# Patient Record
Sex: MALE | Race: WHITE | NOT HISPANIC OR LATINO
[De-identification: names, ages, dates, MRNs, and addresses within clinical notes are randomized per-mention and may not be internally consistent; named-entity substitution may affect disease eponyms.]

---

## 2022-02-20 ENCOUNTER — TRANSCRIPTION ENCOUNTER (OUTPATIENT)
Age: 72
End: 2022-02-20

## 2022-04-04 ENCOUNTER — TRANSCRIPTION ENCOUNTER (OUTPATIENT)
Age: 72
End: 2022-04-04

## 2022-11-18 PROBLEM — Z00.00 ENCOUNTER FOR PREVENTIVE HEALTH EXAMINATION: Status: ACTIVE | Noted: 2022-11-18

## 2022-11-22 ENCOUNTER — APPOINTMENT (OUTPATIENT)
Dept: CARDIOLOGY | Facility: CLINIC | Age: 72
End: 2022-11-22

## 2022-11-22 ENCOUNTER — NON-APPOINTMENT (OUTPATIENT)
Age: 72
End: 2022-11-22

## 2022-11-22 VITALS
BODY MASS INDEX: 35.07 KG/M2 | HEART RATE: 73 BPM | RESPIRATION RATE: 16 BRPM | OXYGEN SATURATION: 95 % | WEIGHT: 245 LBS | DIASTOLIC BLOOD PRESSURE: 78 MMHG | HEIGHT: 70 IN | TEMPERATURE: 98.6 F | SYSTOLIC BLOOD PRESSURE: 136 MMHG

## 2022-11-22 VITALS — SYSTOLIC BLOOD PRESSURE: 140 MMHG | DIASTOLIC BLOOD PRESSURE: 82 MMHG

## 2022-11-22 DIAGNOSIS — R07.89 OTHER CHEST PAIN: ICD-10-CM

## 2022-11-22 DIAGNOSIS — F17.290 NICOTINE DEPENDENCE, OTHER TOBACCO PRODUCT, UNCOMPLICATED: ICD-10-CM

## 2022-11-22 DIAGNOSIS — Z98.890 OTHER SPECIFIED POSTPROCEDURAL STATES: ICD-10-CM

## 2022-11-22 DIAGNOSIS — Z78.9 OTHER SPECIFIED HEALTH STATUS: ICD-10-CM

## 2022-11-22 DIAGNOSIS — E66.3 OVERWEIGHT: ICD-10-CM

## 2022-11-22 DIAGNOSIS — R94.31 ABNORMAL ELECTROCARDIOGRAM [ECG] [EKG]: ICD-10-CM

## 2022-11-22 DIAGNOSIS — Z86.73 PERSONAL HISTORY OF TRANSIENT ISCHEMIC ATTACK (TIA), AND CEREBRAL INFARCTION W/OUT RESIDUAL DEFICITS: ICD-10-CM

## 2022-11-22 DIAGNOSIS — Z01.818 ENCOUNTER FOR OTHER PREPROCEDURAL EXAMINATION: ICD-10-CM

## 2022-11-22 PROCEDURE — 93000 ELECTROCARDIOGRAM COMPLETE: CPT | Mod: NC

## 2022-11-22 PROCEDURE — 99204 OFFICE O/P NEW MOD 45 MIN: CPT | Mod: 25

## 2022-11-29 ENCOUNTER — APPOINTMENT (OUTPATIENT)
Dept: CARDIOLOGY | Facility: CLINIC | Age: 72
End: 2022-11-29

## 2022-11-29 PROCEDURE — A9502: CPT

## 2022-11-29 PROCEDURE — 93015 CV STRESS TEST SUPVJ I&R: CPT

## 2022-11-29 PROCEDURE — 78452 HT MUSCLE IMAGE SPECT MULT: CPT

## 2022-12-05 ENCOUNTER — APPOINTMENT (OUTPATIENT)
Dept: CARDIOLOGY | Facility: CLINIC | Age: 72
End: 2022-12-05

## 2022-12-05 PROCEDURE — 93306 TTE W/DOPPLER COMPLETE: CPT

## 2022-12-05 NOTE — HISTORY OF PRESENT ILLNESS
[FreeTextEntry1] : David is a 72-year-old male with history of CVA in 1995.  This was followed by open heart surgical repair of shunt.  Subsequently, the patient has had cardiac tests which have been normal and unremarkable.\par \par Currently, he does not take any medications.\par \par He denies any shortness of breath.  Activity limited by left knee pain.  He is scheduled to undergo left knee replacement surgery in couple of weeks.  He has occasional epigastric discomfort which is difficult to differentiate from GERD.  No exertional angina.\par \par Baseline EKG has poor wave progression, sinus rhythm, anterior fascicular block.  No significant ST abnormality.\par \par

## 2022-12-05 NOTE — DISCUSSION/SUMMARY
[FreeTextEntry1] : 72-year-old male with coronary factors that include age, gender, history of open heart surgery, obesity.  His baseline EKG is not normal.  He is unable to walk vigorously.  Atypical chest pain should be evaluated with Lexiscan stress test and SPECT perfusion imaging.\par \par Also echocardiogram should be performed.  He had history of shunt that was repaired with open heart procedure in 1995.  The patient had suffered a stroke prior to that.  There has been no recurrence of TIA or CVA since surgery.\par \par Fasting lipid profile should be checked.  Dietary changes to reduce weight as well as LDL were discussed.\par \par The patient will call for the results of his tests.\par \par **Preop addendum on 12/5/2022:\par Stress test with SPECT showed normal perfusion.  LVEF was normal on echocardiogram.  Patient should be low risk for major cardiovascular events from the anticipated surgery which she should proceed.  Currently, he is not taking any anticoagulation or antiplatelet agents.  Postop DVT prophylaxis as per orthopedics.  Is call if there are any questions.\par \par Thank you for this referral and allowing me to participate in the care of this patient.  If I can be of any further help or  if you have any questions, please do not hesitate to contact me\par \par \par Sincerely,\par \par José Luque MD, FACC, PAVEL\par \par

## 2022-12-06 ENCOUNTER — NON-APPOINTMENT (OUTPATIENT)
Age: 72
End: 2022-12-06